# Patient Record
Sex: MALE | Race: OTHER | HISPANIC OR LATINO | Employment: UNEMPLOYED | URBAN - METROPOLITAN AREA
[De-identification: names, ages, dates, MRNs, and addresses within clinical notes are randomized per-mention and may not be internally consistent; named-entity substitution may affect disease eponyms.]

---

## 2022-02-11 ENCOUNTER — HISTORICAL (OUTPATIENT)
Dept: ADMINISTRATIVE | Facility: HOSPITAL | Age: 47
End: 2022-02-11

## 2022-02-11 LAB
ABS NEUT (OLG): 9.18 (ref 2.1–9.2)
ALBUMIN SERPL-MCNC: 3 G/DL (ref 3.5–5)
ALBUMIN/GLOB SERPL: 0.8 {RATIO} (ref 1.1–2)
ALP SERPL-CCNC: 87 U/L (ref 40–150)
ALT SERPL-CCNC: 26 U/L (ref 0–55)
AST SERPL-CCNC: 21 U/L (ref 5–34)
BASOPHILS # BLD AUTO: 0 10*3/UL (ref 0–0.2)
BASOPHILS NFR BLD AUTO: 0 %
BILIRUB SERPL-MCNC: 0.4 MG/DL
BILIRUBIN DIRECT+TOT PNL SERPL-MCNC: 0.1 (ref 0–0.5)
BILIRUBIN DIRECT+TOT PNL SERPL-MCNC: 0.3 (ref 0–0.8)
BUN SERPL-MCNC: 30.5 MG/DL (ref 8.9–20.6)
CALCIUM SERPL-MCNC: 9.2 MG/DL (ref 8.7–10.5)
CHLORIDE SERPL-SCNC: 109 MMOL/L (ref 98–107)
CHOLEST SERPL-MCNC: 291 MG/DL
CHOLEST/HDLC SERPL: 6 {RATIO} (ref 0–5)
CO2 SERPL-SCNC: 21 MMOL/L (ref 22–29)
CREAT SERPL-MCNC: 1.78 MG/DL (ref 0.73–1.18)
ERYTHROCYTE [DISTWIDTH] IN BLOOD BY AUTOMATED COUNT: 13.3 % (ref 11.5–14.5)
EST. AVERAGE GLUCOSE BLD GHB EST-MCNC: 111.2 MG/DL
FLAG2 (OHS): 70
FLAG3 (OHS): 80
FLAGS (OHS): 80
GLOBULIN SER-MCNC: 3.9 G/DL (ref 2.4–3.5)
GLUCOSE SERPL-MCNC: 124 MG/DL (ref 74–100)
HBA1C MFR BLD: 5.5 %
HCT VFR BLD AUTO: 37.6 % (ref 40–51)
HDLC SERPL-MCNC: 49 MG/DL (ref 35–60)
HEMOLYSIS INTERF INDEX SERPL-ACNC: 4
HGB BLD-MCNC: 12.1 G/DL (ref 13.5–17.5)
ICTERIC INTERF INDEX SERPL-ACNC: 0
IMM GRANULOCYTES # BLD AUTO: 0.06 10*3/UL
IMM GRANULOCYTES NFR BLD AUTO: 1 %
LDLC SERPL CALC-MCNC: 211 MG/DL (ref 50–140)
LIPEMIC INTERF INDEX SERPL-ACNC: 9
LOW EVENT # SUSPECT FLAG (OHS): 80
LYMPHOCYTES # BLD AUTO: 0.9 10*3/UL (ref 0.6–4.6)
LYMPHOCYTES NFR BLD AUTO: 9 %
MANUAL DIFF? (OHS): NO
MCH RBC QN AUTO: 30 PG (ref 26–34)
MCHC RBC AUTO-ENTMCNC: 32.2 G/DL (ref 31–37)
MCV RBC AUTO: 93.1 FL (ref 80–100)
MO BLASTS SUSPECT FLAG (OHS): 40
MONOCYTES # BLD AUTO: 0.1 10*3/UL (ref 0.1–1.3)
MONOCYTES NFR BLD AUTO: 1 %
NEUTROPHILS # BLD AUTO: 9.18 10*3/UL (ref 2.1–9.2)
NEUTROPHILS NFR BLD AUTO: 89 %
NRBC BLD AUTO-RTO: 0 % (ref 0–0.2)
PLATELET # BLD AUTO: 316 10*3/UL (ref 130–400)
PLATELET CLUMPS SUSPECT FLAG (OHS): 10
PMV BLD AUTO: 10.2 FL (ref 7.4–10.4)
POTASSIUM SERPL-SCNC: 5.1 MMOL/L (ref 3.5–5.1)
PROT SERPL-MCNC: 6.9 G/DL (ref 6.4–8.3)
RBC # BLD AUTO: 4.04 10*6/UL (ref 4.5–5.9)
SODIUM SERPL-SCNC: 137 MMOL/L (ref 136–145)
TRIGL SERPL-MCNC: 154 MG/DL (ref 34–140)
VLDLC SERPL CALC-MCNC: 31 MG/DL
WBC # SPEC AUTO: 10.3 10*3/UL (ref 4.5–11)

## 2022-09-19 RX ORDER — METOPROLOL SUCCINATE 50 MG/1
50 TABLET, EXTENDED RELEASE ORAL 2 TIMES DAILY
COMMUNITY
Start: 2022-07-29 | End: 2022-09-19 | Stop reason: SDUPTHER

## 2022-09-19 RX ORDER — CLOPIDOGREL BISULFATE 75 MG/1
75 TABLET ORAL DAILY
COMMUNITY
Start: 2022-07-29 | End: 2022-09-19 | Stop reason: SDUPTHER

## 2022-09-19 RX ORDER — NAPROXEN SODIUM 220 MG/1
81 TABLET, FILM COATED ORAL DAILY
COMMUNITY
Start: 2022-07-29 | End: 2022-09-19 | Stop reason: SDUPTHER

## 2022-09-19 RX ORDER — NITROGLYCERIN 0.4 MG/1
0.4 TABLET SUBLINGUAL EVERY 5 MIN PRN
COMMUNITY
Start: 2021-08-31 | End: 2022-09-19 | Stop reason: SDUPTHER

## 2022-09-19 RX ORDER — LISINOPRIL 20 MG/1
20 TABLET ORAL DAILY
COMMUNITY
Start: 2022-06-16 | End: 2022-09-19 | Stop reason: SDUPTHER

## 2022-09-19 RX ORDER — ATORVASTATIN CALCIUM 80 MG/1
80 TABLET, FILM COATED ORAL DAILY
COMMUNITY
Start: 2022-07-29 | End: 2022-09-19 | Stop reason: SDUPTHER

## 2022-09-20 RX ORDER — ATORVASTATIN CALCIUM 80 MG/1
80 TABLET, FILM COATED ORAL DAILY
Qty: 30 TABLET | Refills: 6 | Status: SHIPPED | OUTPATIENT
Start: 2022-09-20 | End: 2022-09-29

## 2022-09-20 RX ORDER — NITROGLYCERIN 0.4 MG/1
0.4 TABLET SUBLINGUAL EVERY 5 MIN PRN
Qty: 30 TABLET | Refills: 1 | Status: SHIPPED | OUTPATIENT
Start: 2022-09-20

## 2022-09-20 RX ORDER — CLOPIDOGREL BISULFATE 75 MG/1
75 TABLET ORAL DAILY
Qty: 30 TABLET | Refills: 6 | Status: SHIPPED | OUTPATIENT
Start: 2022-09-20 | End: 2022-09-29

## 2022-09-20 RX ORDER — LISINOPRIL 20 MG/1
20 TABLET ORAL DAILY
Qty: 30 TABLET | Refills: 6 | Status: SHIPPED | OUTPATIENT
Start: 2022-09-20 | End: 2022-09-29

## 2022-09-20 RX ORDER — NAPROXEN SODIUM 220 MG/1
81 TABLET, FILM COATED ORAL DAILY
Qty: 30 TABLET | Refills: 6 | Status: SHIPPED | OUTPATIENT
Start: 2022-09-20 | End: 2022-09-29

## 2022-09-20 RX ORDER — METOPROLOL SUCCINATE 50 MG/1
50 TABLET, EXTENDED RELEASE ORAL 2 TIMES DAILY
Qty: 60 TABLET | Refills: 6 | Status: SHIPPED | OUTPATIENT
Start: 2022-09-20 | End: 2022-09-29

## 2022-09-29 ENCOUNTER — OFFICE VISIT (OUTPATIENT)
Dept: CARDIOLOGY | Facility: CLINIC | Age: 47
End: 2022-09-29

## 2022-09-29 VITALS
OXYGEN SATURATION: 97 % | HEART RATE: 60 BPM | DIASTOLIC BLOOD PRESSURE: 97 MMHG | RESPIRATION RATE: 20 BRPM | WEIGHT: 218.5 LBS | TEMPERATURE: 100 F | HEIGHT: 63 IN | BODY MASS INDEX: 38.71 KG/M2 | SYSTOLIC BLOOD PRESSURE: 167 MMHG

## 2022-09-29 DIAGNOSIS — E78.49 OTHER HYPERLIPIDEMIA: ICD-10-CM

## 2022-09-29 DIAGNOSIS — I10 PRIMARY HYPERTENSION: ICD-10-CM

## 2022-09-29 DIAGNOSIS — I25.10 CORONARY ARTERY DISEASE INVOLVING NATIVE CORONARY ARTERY OF NATIVE HEART WITHOUT ANGINA PECTORIS: Primary | ICD-10-CM

## 2022-09-29 DIAGNOSIS — E78.5 HYPERLIPIDEMIA, UNSPECIFIED HYPERLIPIDEMIA TYPE: ICD-10-CM

## 2022-09-29 DIAGNOSIS — E66.9 CLASS II OBESITY: ICD-10-CM

## 2022-09-29 PROCEDURE — 99213 OFFICE O/P EST LOW 20 MIN: CPT | Mod: PBBFAC | Performed by: INTERNAL MEDICINE

## 2022-09-29 RX ORDER — NAPROXEN SODIUM 220 MG/1
81 TABLET, FILM COATED ORAL DAILY
Qty: 90 TABLET | Refills: 1 | Status: SHIPPED | OUTPATIENT
Start: 2022-09-29 | End: 2023-09-29

## 2022-09-29 RX ORDER — PANTOPRAZOLE SODIUM 20 MG/1
20 TABLET, DELAYED RELEASE ORAL DAILY
COMMUNITY
Start: 2022-07-29

## 2022-09-29 RX ORDER — LISINOPRIL 20 MG/1
20 TABLET ORAL DAILY
Qty: 90 TABLET | Refills: 0 | Status: SHIPPED | OUTPATIENT
Start: 2022-09-29 | End: 2023-09-29

## 2022-09-29 RX ORDER — ATORVASTATIN CALCIUM 80 MG/1
80 TABLET, FILM COATED ORAL DAILY
Qty: 90 TABLET | Refills: 1 | Status: SHIPPED | OUTPATIENT
Start: 2022-09-29 | End: 2023-09-29

## 2022-09-29 RX ORDER — CLOPIDOGREL BISULFATE 75 MG/1
75 TABLET ORAL DAILY
Qty: 90 TABLET | Refills: 1 | Status: SHIPPED | OUTPATIENT
Start: 2022-09-29 | End: 2023-09-29

## 2022-09-29 RX ORDER — CARVEDILOL 12.5 MG/1
12.5 TABLET ORAL 2 TIMES DAILY WITH MEALS
Qty: 180 TABLET | Refills: 1 | Status: SHIPPED | OUTPATIENT
Start: 2022-09-29 | End: 2023-09-29

## 2022-09-29 NOTE — PROGRESS NOTES
CHIEF COMPLAINT: Follow up HTN, CAD s/p PCI with MANUEL to mLAD           HPI:    Mr. Blas hSelby is a 46 y.o. male with CAD s/p PCI with MANUEL to mLAD 8/2021, HTN, Class II Obesity, and HLD who presents to clinic for follow up.     No acute complaints today. Reports he has been out of his medications for 1 week.     Does not check blood pressure at home.     Denies angina or anginal equivalents.     Denies symptoms of CHF.     He works during the day and is active.     Patient Active Problem List   Diagnosis    Class II obesity    Hypertension    Coronary artery disease involving native coronary artery of native heart without angina pectoris    Other hyperlipidemia       Past Surgical History:   Procedure Laterality Date    CARDIAC CATHETERIZATION         Social History     Socioeconomic History    Marital status:    Tobacco Use    Smoking status: Former     Types: Cigarettes    Smokeless tobacco: Never   Substance and Sexual Activity    Alcohol use: Yes     Alcohol/week: 12.0 standard drinks     Types: 12 Cans of beer per week          History reviewed. No pertinent family history.    Review of patient's allergies indicates:  No Known Allergies      Current Outpatient Medications:     nitroGLYCERIN (NITROSTAT) 0.4 MG SL tablet, Place 1 tablet (0.4 mg total) under the tongue every 5 (five) minutes as needed for Chest pain. If chest pain not relieved in 5 minuets after first does, seek immediate medical attention, Disp: 30 tablet, Rfl: 1    pantoprazole (PROTONIX) 20 MG tablet, Take 20 mg by mouth once daily., Disp: , Rfl:     aspirin 81 MG Chew, Take 1 tablet (81 mg total) by mouth once daily., Disp: 90 tablet, Rfl: 1    atorvastatin (LIPITOR) 80 MG tablet, Take 1 tablet (80 mg total) by mouth once daily., Disp: 90 tablet, Rfl: 1    carvediloL (COREG) 12.5 MG tablet, Take 1 tablet (12.5 mg total) by mouth 2 (two) times daily with meals., Disp: 180 tablet, Rfl: 1    clopidogreL (PLAVIX) 75 mg tablet,  "Take 1 tablet (75 mg total) by mouth once daily., Disp: 90 tablet, Rfl: 1    lisinopriL (PRINIVIL,ZESTRIL) 20 MG tablet, Take 1 tablet (20 mg total) by mouth once daily., Disp: 90 tablet, Rfl: 0     ROS:                                                                                                                                                                             Denies headaches, changes in vision, nausea, vomiting, fevers, chills, chest pain, palpitations, dyspnea, abdominal pain, changes in urinary or bowel habits.      PE:  Blood pressure (!) 167/97, pulse 60, temperature 99.7 °F (37.6 °C), temperature source Oral, resp. rate 20, height 5' 2.99" (1.6 m), weight 99.1 kg (218 lb 7.6 oz), SpO2 97 %.  Physical Exam  Vitals reviewed.   Constitutional:       Appearance: He is obese.   HENT:      Head: Normocephalic and atraumatic.      Right Ear: External ear normal.      Left Ear: External ear normal.      Nose: Nose normal.      Mouth/Throat:      Mouth: Mucous membranes are moist.   Eyes:      Conjunctiva/sclera: Conjunctivae normal.   Cardiovascular:      Rate and Rhythm: Normal rate and regular rhythm.      Pulses: Normal pulses.      Heart sounds: Normal heart sounds. No murmur heard.  Pulmonary:      Effort: Pulmonary effort is normal. No respiratory distress.      Breath sounds: Normal breath sounds. No stridor. No wheezing, rhonchi or rales.   Chest:      Chest wall: No tenderness.   Abdominal:      General: Abdomen is flat. Bowel sounds are normal. There is no distension.      Palpations: Abdomen is soft.   Musculoskeletal:      Cervical back: Neck supple.      Right lower leg: No edema.      Left lower leg: No edema.   Skin:     General: Skin is warm and dry.      Capillary Refill: Capillary refill takes less than 2 seconds.   Neurological:      Mental Status: He is alert and oriented to person, place, and time.   Psychiatric:         Mood and Affect: Mood normal.         Behavior: Behavior " normal.     Component      Latest Ref Rng & Units 2/11/2022   Sodium      136 - 145 137   Potassium      3.5 - 5.1 5.1   Chloride      98 - 107 109   CO2      22 - 29 21   Glucose      74 - 100 124   BUN      8.9 - 20.6 30.5   Creatinine      0.73 - 1.18 1.78   Calcium      8.7 - 10.5 9.2   Albumin      3.5 - 5.0 3.0   PROTEIN TOTAL      6.4 - 8.3 6.9   Globulin, Total      2.4 - 3.5 3.9   Albumin/Globulin Ratio      1.1 - 2.0 0.8   Alkaline Phosphatase      40 - 150 87   BILIRUBIN TOTAL      <=1.5 0.4   Bilirubin, Direct      0.0 - 0.5 0.1   Bilirubin, Indirect      0.00 - 0.80 0.30   AST      5 - 34 21   ALT      0 - 55 26   Hemolysis       4   Icterus       0   Lipemia       9   WBC      4.5 - 11.0 10.3   RBC      4.50 - 5.90 4.04   Hemoglobin      13.5 - 17.5 12.1   Hematocrit      40.0 - 51.0 37.6   MCV      80.0 - 100.0 93.1   MCH      26.0 - 34.0 30.0   MCHC      31.0 - 37.0 32.2   RDW      11.5 - 14.5 13.3   Platelets      130 - 400 316   MPV      7.4 - 10.4 10.2   Gran # (ANC)      2.10 - 9.20 9.18   Manual Diff?       No   Low Event #       80   MO Blasts       40   Platelet Clumps       10   FLAGS       80   FLAG2       70   FLAG3       80   Cholesterol      <=200 291   HDL      35 - 60 49   Triglycerides      34 - 140 154   Very Low Density Lipoprotein       31   Total Cholesterol/HDL Ratio      0 - 5 6   LDL Cholesterol External      50.00 - 140.00 211.00   Hemoglobin A1C External      <=7.0 5.5   Estimated Avg Glucose       111.2                                                                                                                                                                                                                                                                                                                                                                                                                                                                                CARDIAC  TESTING:    Diagnostics:   [facility] 8.28.21  1. Left main: No significant disease  2. LAD: 100% occluded in the midportion immediately after a septal  branch  3. Left circumflex: Shows disease of about 30% with no obstructive lesions.  4. RCA: Very large and dominant with a 40-50% stenosis in the midportion with haziness.  No obstructive lesions.    Description of intervention:  Successful percutaneous coronary intervention to the mid LAD in the setting of an acute myocardial infarction (acute  anterior STEMI) involving balloon angioplasty and placement of a 3.5 x 22 mm drug eluting stent, postdilated to  approximately 3.6 mm with excellent angiographic results. He received intra-coronary nitroglycerin, Aggrastat.     ASSESSMENT/PLAN:    1. Primary hypertension  -Not at goal today but has been without medications for 1 weeks. Does not check at home. BP  log next office visit.   -Stop Toprol XL.   -Continue Lisinopril 20 mg daily. BMP in 10 days after starting to check Cr and K.   -Start Coreg 12.5 mg BID.   -Nurse visit. Increase Lisinopril to 40 mg if not at goal.     2. Hyperlipidemia, unspecified hyperlipidemia type  -High intensity statin therapy. Most recent not at goal. Will need FLP at next office visit.   -Add Zetia at next office visit if patient is adherent with statin and still not at goal.     3. Coronary artery disease involving native coronary artery of native heart without angina pectoris  -Continue DAPT for now.   -Statin. Lipids not at goal. FLP next OV.   -Blood pressure control.     4. Class II obesity  -Weight loss.       Christiano Wilson MD

## 2022-09-29 NOTE — PROGRESS NOTES
Cardiology Attending    I have discussed Mr. Blas Shelby including his symptoms, findings, and management plan with the cardiology fellow.  Please see the Cardiology Clinic Note for details.           Saman Arriaga MD